# Patient Record
Sex: MALE | Race: WHITE | Employment: STUDENT | URBAN - METROPOLITAN AREA
[De-identification: names, ages, dates, MRNs, and addresses within clinical notes are randomized per-mention and may not be internally consistent; named-entity substitution may affect disease eponyms.]

---
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
## 2019-07-03 ENCOUNTER — HOSPITAL ENCOUNTER (OUTPATIENT)
Dept: GENERAL RADIOLOGY | Age: 19
Discharge: HOME | End: 2019-07-03
Attending: FAMILY MEDICINE
Payer: COMMERCIAL

## 2019-07-03 DIAGNOSIS — M79.605 LEFT LEG PAIN: ICD-10-CM

## 2019-07-03 DIAGNOSIS — M79.605 PAIN IN LEFT LEG: ICD-10-CM

## 2019-07-03 PROCEDURE — 73590 X-RAY EXAM OF LOWER LEG: CPT

## 2019-07-03 PROCEDURE — 73630 X-RAY EXAM OF FOOT: CPT

## 2019-07-03 PROCEDURE — 73610 X-RAY EXAM OF ANKLE: CPT

## 2020-10-26 ENCOUNTER — TRANSCRIBE ORDER (OUTPATIENT)
Dept: SCHEDULING | Age: 20
End: 2020-10-26

## 2020-10-26 DIAGNOSIS — M79.669 LOWER LEG PAIN: Primary | ICD-10-CM

## 2022-07-27 ENCOUNTER — TRANSCRIBE ORDER (OUTPATIENT)
Dept: GENERAL RADIOLOGY | Age: 22
End: 2022-07-27

## 2022-07-27 ENCOUNTER — HOSPITAL ENCOUNTER (OUTPATIENT)
Dept: GENERAL RADIOLOGY | Age: 22
Discharge: HOME | End: 2022-07-27
Attending: FAMILY MEDICINE
Payer: COMMERCIAL

## 2022-07-27 DIAGNOSIS — M79.672 LEFT FOOT PAIN: Primary | ICD-10-CM

## 2022-07-27 DIAGNOSIS — M79.672 LEFT FOOT PAIN: ICD-10-CM

## 2022-07-27 PROCEDURE — 73630 X-RAY EXAM OF FOOT: CPT

## 2023-05-02 ENCOUNTER — TRANSCRIBE ORDER (OUTPATIENT)
Dept: SCHEDULING | Age: 23
End: 2023-05-02

## 2023-05-02 DIAGNOSIS — R16.1 SPLENOMEGALY, NOT ELSEWHERE CLASSIFIED: Primary | ICD-10-CM

## 2023-05-11 ENCOUNTER — TRANSCRIBE ORDERS (OUTPATIENT)
Facility: HOSPITAL | Age: 23
End: 2023-05-11

## 2023-05-11 DIAGNOSIS — R16.1 SPLENOMEGALY, NOT ELSEWHERE CLASSIFIED: Primary | ICD-10-CM

## 2023-05-18 ENCOUNTER — HOSPITAL ENCOUNTER (OUTPATIENT)
Facility: HOSPITAL | Age: 23
Discharge: HOME | End: 2023-05-18
Payer: COMMERCIAL

## 2023-05-18 DIAGNOSIS — R16.1 SPLENOMEGALY, NOT ELSEWHERE CLASSIFIED: ICD-10-CM

## 2023-05-18 PROCEDURE — 74150 CT ABDOMEN W/O CONTRAST: CPT

## 2023-11-30 ENCOUNTER — HOSPITAL ENCOUNTER (OUTPATIENT)
Facility: HOSPITAL | Age: 23
Discharge: HOME | End: 2023-11-30
Attending: FAMILY MEDICINE
Payer: COMMERCIAL

## 2023-11-30 DIAGNOSIS — R16.1 SPLENOMEGALY, NOT ELSEWHERE CLASSIFIED: ICD-10-CM

## 2023-11-30 PROCEDURE — 76700 US EXAM ABDOM COMPLETE: CPT

## 2024-09-05 ENCOUNTER — HOSPITAL ENCOUNTER (OUTPATIENT)
Facility: HOSPITAL | Age: 24
Discharge: HOME | End: 2024-09-08
Attending: INTERNAL MEDICINE
Payer: COMMERCIAL

## 2024-09-05 DIAGNOSIS — R16.1 SPLENOMEGALY: ICD-10-CM

## 2024-09-05 PROCEDURE — 76705 ECHO EXAM OF ABDOMEN: CPT

## 2025-08-01 ENCOUNTER — TELEPHONE (OUTPATIENT)
Age: 25
End: 2025-08-01

## 2025-08-01 NOTE — TELEPHONE ENCOUNTER
*PENDING REVIEW*, Received referral request, referral is pending review from provider will contact patient once we are given the ok to schedule New Patient [facility].

## (undated) DEVICE — SUTURE CHROMIC GUT SZ 3-0 L27IN ABSRB BRN L19MM PS-2 3/8 1638H

## (undated) DEVICE — REM POLYHESIVE ADULT PATIENT RETURN ELECTRODE: Brand: VALLEYLAB

## (undated) DEVICE — SYR LR LCK 1ML GRAD NSAF 30ML --

## (undated) DEVICE — 2.1MM CROSS CUT FISSURE

## (undated) DEVICE — SURGICAL PROCEDURE PACK BASIN MAJ SET CUST NO CAUT

## (undated) DEVICE — DEVON™ KNEE AND BODY STRAP 60" X 3" (1.5 M X 7.6 CM): Brand: DEVON

## (undated) DEVICE — NEEDLE HYPO 25GA L1.5IN BVL ORIENTED ECLIPSE

## (undated) DEVICE — SOLUTION IV 1000ML 0.9% SOD CHL

## (undated) DEVICE — GAUZE SPONGES,12 PLY: Brand: CURITY

## (undated) DEVICE — HANDLE LT SNAP ON ULT DURABLE LENS FOR TRUMPF ALC DISPOSABLE

## (undated) DEVICE — ELECTRODE NDL 2.8IN COAT VALLEYLAB

## (undated) DEVICE — (D)SYR 10ML 1/5ML GRAD NSAF -- PKGING CHANGE USE ITEM 338027

## (undated) DEVICE — ARGYLE FRAZIER SURGICAL SUCTION INSTRUMENT 10 FR/CH (3.3 MM): Brand: ARGYLE

## (undated) DEVICE — Device

## (undated) DEVICE — INFECTION CONTROL KIT SYS

## (undated) DEVICE — 1200 GUARD II KIT W/5MM TUBE W/O VAC TUBE: Brand: GUARDIAN

## (undated) DEVICE — MEDI-VAC NON-CONDUCTIVE SUCTION TUBING: Brand: CARDINAL HEALTH

## (undated) DEVICE — NDL PRT INJ NSAF BLNT 18GX1.5 --

## (undated) DEVICE — INTENDED FOR TISSUE SEPARATION, AND OTHER PROCEDURES THAT REQUIRE A SHARP SURGICAL BLADE TO PUNCTURE OR CUT.: Brand: BARD-PARKER ® CARBON RIB-BACK BLADES

## (undated) DEVICE — TOWEL SURG W17XL27IN STD BLU COT NONFENESTRATED PREWASHED

## (undated) DEVICE — ROCKER SWITCH PENCIL BLADE ELECTRODE, HOLSTER: Brand: EDGE

## (undated) DEVICE — PACK,EENT,TURBAN DRAPE,PK II: Brand: MEDLINE